# Patient Record
Sex: FEMALE | Race: WHITE | NOT HISPANIC OR LATINO | URBAN - METROPOLITAN AREA
[De-identification: names, ages, dates, MRNs, and addresses within clinical notes are randomized per-mention and may not be internally consistent; named-entity substitution may affect disease eponyms.]

---

## 2024-11-30 ENCOUNTER — OFFICE VISIT (OUTPATIENT)
Dept: URGENT CARE | Age: 59
End: 2024-11-30
Payer: COMMERCIAL

## 2024-11-30 VITALS
DIASTOLIC BLOOD PRESSURE: 96 MMHG | TEMPERATURE: 97.6 F | HEART RATE: 80 BPM | SYSTOLIC BLOOD PRESSURE: 162 MMHG | OXYGEN SATURATION: 97 % | RESPIRATION RATE: 16 BRPM

## 2024-11-30 DIAGNOSIS — M54.50 ACUTE LOW BACK PAIN, UNSPECIFIED BACK PAIN LATERALITY, UNSPECIFIED WHETHER SCIATICA PRESENT: Primary | ICD-10-CM

## 2024-11-30 LAB
POC APPEARANCE, URINE: CLEAR
POC BILIRUBIN, URINE: NEGATIVE
POC BLOOD, URINE: NEGATIVE
POC COLOR, URINE: YELLOW
POC GLUCOSE, URINE: ABNORMAL MG/DL
POC KETONES, URINE: NEGATIVE MG/DL
POC LEUKOCYTES, URINE: NEGATIVE
POC NITRITE,URINE: NEGATIVE
POC PH, URINE: 6 PH
POC PROTEIN, URINE: ABNORMAL MG/DL
POC SPECIFIC GRAVITY, URINE: 1.01
POC UROBILINOGEN, URINE: 0.2 EU/DL

## 2024-11-30 RX ORDER — METHOCARBAMOL 500 MG/1
500 TABLET, FILM COATED ORAL 3 TIMES DAILY
Qty: 20 TABLET | Refills: 0 | Status: SHIPPED | OUTPATIENT
Start: 2024-11-30

## 2024-11-30 RX ORDER — NAPROXEN 375 MG/1
375 TABLET ORAL
Qty: 20 TABLET | Refills: 0 | Status: SHIPPED | OUTPATIENT
Start: 2024-11-30

## 2024-11-30 ASSESSMENT — ENCOUNTER SYMPTOMS
RESPIRATORY NEGATIVE: 1
EYES NEGATIVE: 1
HEMATURIA: 0
CONSTITUTIONAL NEGATIVE: 1
CARDIOVASCULAR NEGATIVE: 1
MYALGIAS: 1
WEAKNESS: 0
HEMATOLOGIC/LYMPHATIC NEGATIVE: 1
NUMBNESS: 0
DYSURIA: 0
ALLERGIC/IMMUNOLOGIC NEGATIVE: 1
PSYCHIATRIC NEGATIVE: 1
ENDOCRINE NEGATIVE: 1
ABDOMINAL PAIN: 0
BACK PAIN: 1

## 2024-11-30 ASSESSMENT — PAIN SCALES - GENERAL: PAINLEVEL_OUTOF10: 10-WORST PAIN EVER

## 2024-11-30 NOTE — PROGRESS NOTES
Subjective   Patient ID: Bindu Klein is a 59 y.o. female. They present today with a chief complaint of Back Pain (2 weeks x lower back pain,nausea, urinary frequency/ decreased urination  ).    History of Present Illness    History provided by:  Patient   used: No    Back Pain  Pertinent negatives include no abdominal pain, dysuria, numbness or weakness.     This is a 59 yr old female here for back pain, Right > left mid to low back pain x several weeks. Denies fall or injury. Denies abdominal pain radiating into the back, dysuria, hematuria, leg or arm pain, parasthesias or weakness in extremities.     Past Medical History  Allergies as of 11/30/2024    (No Known Allergies)       (Not in a hospital admission)       No past medical history on file.    No past surgical history on file.        Review of Systems  Review of Systems   Constitutional: Negative.    HENT: Negative.     Eyes: Negative.    Respiratory: Negative.     Cardiovascular: Negative.    Gastrointestinal:  Negative for abdominal pain.   Endocrine: Negative.    Genitourinary:  Negative for dysuria and hematuria.   Musculoskeletal:  Positive for back pain and myalgias.   Skin: Negative.    Allergic/Immunologic: Negative.    Neurological:  Negative for weakness and numbness.   Hematological: Negative.    Psychiatric/Behavioral: Negative.     All other systems reviewed and are negative.       Objective    Vitals:    11/30/24 1125   BP: (!) 162/96   Pulse: 80   Resp: 16   Temp: 36.4 °C (97.6 °F)   SpO2: 97%     No LMP recorded.    Physical Exam  Vitals and nursing note reviewed.   Constitutional:       Appearance: Normal appearance.   HENT:      Head: Normocephalic and atraumatic.   Cardiovascular:      Rate and Rhythm: Normal rate and regular rhythm.   Pulmonary:      Effort: Pulmonary effort is normal.      Breath sounds: Normal breath sounds.   Abdominal:      Palpations: Abdomen is soft.      Tenderness: There is no abdominal  tenderness.   Musculoskeletal:      Comments: No t or l/s vertebral body tenderness with palpation,paraspinal pain with palpation, limited ROM secondary to pain, distal n-v intact   Skin:     General: Skin is warm and dry.   Neurological:      General: No focal deficit present.      Mental Status: She is alert and oriented to person, place, and time.   Psychiatric:         Mood and Affect: Mood normal.         Behavior: Behavior normal.       Procedures    Point of Care Test & Imaging Results from this visit  Results for orders placed or performed in visit on 11/30/24   POCT UA Automated manually resulted   Result Value Ref Range    POC Color, Urine Yellow Straw, Yellow, Light-Yellow    POC Appearance, Urine Clear Clear    POC Glucose, Urine >=1000 (4+) (A) NEGATIVE mg/dl    POC Bilirubin, Urine NEGATIVE NEGATIVE    POC Ketones, Urine NEGATIVE NEGATIVE mg/dl    POC Specific Gravity, Urine 1.015 1.005 - 1.035    POC Blood, Urine NEGATIVE NEGATIVE    POC PH, Urine 6.0 No Reference Range Established PH    POC Protein, Urine 15 (1+) (A) NEGATIVE, 30 (1+) mg/dl    POC Urobilinogen, Urine 0.2 0.2, 1.0 EU/DL    Poc Nitrite, Urine NEGATIVE NEGATIVE    POC Leukocytes, Urine NEGATIVE NEGATIVE      No results found.    Diagnostic study results (if any) were reviewed by Jacqui Arzola PA-C.    Assessment/Plan   Allergies, medications, history, and pertinent labs/EKGs/Imaging reviewed by Jacqui Arzola PA-C.     Orders and Diagnoses  Diagnoses and all orders for this visit:  Acute low back pain, unspecified back pain laterality, unspecified whether sciatica present  -     POCT UA Automated manually resulted  -     methocarbamol (Robaxin) 500 mg tablet; Take 1 tablet (500 mg) by mouth 3 times a day. May make drowsy  -     naproxen (Naprosyn) 375 mg tablet; Take 1 tablet (375 mg) by mouth 2 times daily (morning and late afternoon). Take with food    Plan:  No blood or leuks in the urine  Meds as above  Ice or heat to  painful areas 2-3 times a day  Pcp follow up this week if not improving or worsening  ER visit anytime 24/7 for acute worsening or changing condition    Patient disposition: Home    Electronically signed by Jacqui Arzola PA-C  1:18 PM

## 2024-11-30 NOTE — PATIENT INSTRUCTIONS
Ice or heat 2-3 times a day   Pcp follow up this week if not improving or worsening  ER visit anytime 24/7 for acute worsening or changing condition

## 2024-12-09 ENCOUNTER — APPOINTMENT (OUTPATIENT)
Dept: RADIOLOGY | Facility: HOSPITAL | Age: 59
End: 2024-12-09
Payer: COMMERCIAL

## 2024-12-09 ENCOUNTER — HOSPITAL ENCOUNTER (EMERGENCY)
Facility: HOSPITAL | Age: 59
Discharge: HOME | End: 2024-12-09
Attending: EMERGENCY MEDICINE
Payer: COMMERCIAL

## 2024-12-09 VITALS
OXYGEN SATURATION: 98 % | HEIGHT: 62 IN | BODY MASS INDEX: 37.61 KG/M2 | WEIGHT: 204.37 LBS | RESPIRATION RATE: 18 BRPM | SYSTOLIC BLOOD PRESSURE: 176 MMHG | DIASTOLIC BLOOD PRESSURE: 86 MMHG | TEMPERATURE: 98.2 F | HEART RATE: 78 BPM

## 2024-12-09 DIAGNOSIS — M54.50 CHRONIC BILATERAL LOW BACK PAIN, UNSPECIFIED WHETHER SCIATICA PRESENT: Primary | ICD-10-CM

## 2024-12-09 DIAGNOSIS — M54.50 ACUTE LOW BACK PAIN, UNSPECIFIED BACK PAIN LATERALITY, UNSPECIFIED WHETHER SCIATICA PRESENT: ICD-10-CM

## 2024-12-09 DIAGNOSIS — J18.9 PNEUMONIA DUE TO INFECTIOUS ORGANISM, UNSPECIFIED LATERALITY, UNSPECIFIED PART OF LUNG: ICD-10-CM

## 2024-12-09 DIAGNOSIS — G89.29 CHRONIC BILATERAL LOW BACK PAIN, UNSPECIFIED WHETHER SCIATICA PRESENT: Primary | ICD-10-CM

## 2024-12-09 LAB
ALBUMIN SERPL BCP-MCNC: 4.3 G/DL (ref 3.4–5)
ALP SERPL-CCNC: 108 U/L (ref 33–110)
ALT SERPL W P-5'-P-CCNC: 32 U/L (ref 7–45)
ANION GAP SERPL CALCULATED.3IONS-SCNC: 11 MMOL/L (ref 10–20)
APPEARANCE UR: CLEAR
AST SERPL W P-5'-P-CCNC: 32 U/L (ref 9–39)
BACTERIA #/AREA URNS AUTO: ABNORMAL /HPF
BASOPHILS # BLD MANUAL: 0 X10*3/UL (ref 0–0.1)
BASOPHILS NFR BLD MANUAL: 0 %
BILIRUB SERPL-MCNC: 0.4 MG/DL (ref 0–1.2)
BILIRUB UR STRIP.AUTO-MCNC: NEGATIVE MG/DL
BUN SERPL-MCNC: 16 MG/DL (ref 6–23)
CALCIUM SERPL-MCNC: 9.4 MG/DL (ref 8.6–10.3)
CHLORIDE SERPL-SCNC: 98 MMOL/L (ref 98–107)
CO2 SERPL-SCNC: 30 MMOL/L (ref 21–32)
COLOR UR: ABNORMAL
CREAT SERPL-MCNC: 1.01 MG/DL (ref 0.5–1.05)
EGFRCR SERPLBLD CKD-EPI 2021: 65 ML/MIN/1.73M*2
EOSINOPHIL # BLD MANUAL: 0 X10*3/UL (ref 0–0.7)
EOSINOPHIL NFR BLD MANUAL: 0 %
ERYTHROCYTE [DISTWIDTH] IN BLOOD BY AUTOMATED COUNT: 13.2 % (ref 11.5–14.5)
GLUCOSE SERPL-MCNC: 261 MG/DL (ref 74–99)
GLUCOSE UR STRIP.AUTO-MCNC: ABNORMAL MG/DL
HCG UR QL IA.RAPID: NEGATIVE
HCT VFR BLD AUTO: 43.9 % (ref 36–46)
HGB BLD-MCNC: 15.1 G/DL (ref 12–16)
HOLD SPECIMEN: NORMAL
IMM GRANULOCYTES # BLD AUTO: 0.07 X10*3/UL (ref 0–0.7)
IMM GRANULOCYTES NFR BLD AUTO: 1.4 % (ref 0–0.9)
KETONES UR STRIP.AUTO-MCNC: ABNORMAL MG/DL
LEUKOCYTE ESTERASE UR QL STRIP.AUTO: ABNORMAL
LIPASE SERPL-CCNC: 12 U/L (ref 9–82)
LYMPHOCYTES # BLD MANUAL: 2.96 X10*3/UL (ref 1.2–4.8)
LYMPHOCYTES NFR BLD MANUAL: 58 %
MCH RBC QN AUTO: 31.5 PG (ref 26–34)
MCHC RBC AUTO-ENTMCNC: 34.4 G/DL (ref 32–36)
MCV RBC AUTO: 92 FL (ref 80–100)
MONOCYTES # BLD MANUAL: 0.31 X10*3/UL (ref 0.1–1)
MONOCYTES NFR BLD MANUAL: 6 %
MUCOUS THREADS #/AREA URNS AUTO: ABNORMAL /LPF
NEUTROPHILS # BLD MANUAL: 1.73 X10*3/UL (ref 1.2–7.7)
NEUTS BAND # BLD MANUAL: 0.05 X10*3/UL (ref 0–0.7)
NEUTS BAND NFR BLD MANUAL: 1 %
NEUTS SEG # BLD MANUAL: 1.68 X10*3/UL (ref 1.2–7)
NEUTS SEG NFR BLD MANUAL: 33 %
NITRITE UR QL STRIP.AUTO: NEGATIVE
NRBC BLD-RTO: 0 /100 WBCS (ref 0–0)
PH UR STRIP.AUTO: 6 [PH]
PLATELET # BLD AUTO: 178 X10*3/UL (ref 150–450)
POTASSIUM SERPL-SCNC: 4.3 MMOL/L (ref 3.5–5.3)
PROT SERPL-MCNC: 7.9 G/DL (ref 6.4–8.2)
PROT UR STRIP.AUTO-MCNC: ABNORMAL MG/DL
RBC # BLD AUTO: 4.79 X10*6/UL (ref 4–5.2)
RBC # UR STRIP.AUTO: ABNORMAL /UL
RBC #/AREA URNS AUTO: ABNORMAL /HPF
RBC MORPH BLD: NORMAL
SODIUM SERPL-SCNC: 135 MMOL/L (ref 136–145)
SP GR UR STRIP.AUTO: 1.04
SQUAMOUS #/AREA URNS AUTO: ABNORMAL /HPF
TOTAL CELLS COUNTED BLD: 100
TRANS CELLS #/AREA UR COMP ASSIST: ABNORMAL /HPF
UROBILINOGEN UR STRIP.AUTO-MCNC: NORMAL MG/DL
VARIANT LYMPHS # BLD MANUAL: 0.1 X10*3/UL (ref 0–0.5)
VARIANT LYMPHS NFR BLD: 2 %
WBC # BLD AUTO: 5.1 X10*3/UL (ref 4.4–11.3)
WBC #/AREA URNS AUTO: ABNORMAL /HPF

## 2024-12-09 PROCEDURE — 74177 CT ABD & PELVIS W/CONTRAST: CPT

## 2024-12-09 PROCEDURE — 2550000001 HC RX 255 CONTRASTS: Performed by: EMERGENCY MEDICINE

## 2024-12-09 PROCEDURE — 96374 THER/PROPH/DIAG INJ IV PUSH: CPT | Performed by: EMERGENCY MEDICINE

## 2024-12-09 PROCEDURE — 81025 URINE PREGNANCY TEST: CPT | Performed by: EMERGENCY MEDICINE

## 2024-12-09 PROCEDURE — 36415 COLL VENOUS BLD VENIPUNCTURE: CPT | Performed by: EMERGENCY MEDICINE

## 2024-12-09 PROCEDURE — 74177 CT ABD & PELVIS W/CONTRAST: CPT | Mod: FOREIGN READ | Performed by: RADIOLOGY

## 2024-12-09 PROCEDURE — 87086 URINE CULTURE/COLONY COUNT: CPT | Mod: WESLAB | Performed by: EMERGENCY MEDICINE

## 2024-12-09 PROCEDURE — 99285 EMERGENCY DEPT VISIT HI MDM: CPT | Mod: 25 | Performed by: EMERGENCY MEDICINE

## 2024-12-09 PROCEDURE — 85027 COMPLETE CBC AUTOMATED: CPT | Performed by: EMERGENCY MEDICINE

## 2024-12-09 PROCEDURE — 2500000001 HC RX 250 WO HCPCS SELF ADMINISTERED DRUGS (ALT 637 FOR MEDICARE OP): Performed by: EMERGENCY MEDICINE

## 2024-12-09 PROCEDURE — 2500000004 HC RX 250 GENERAL PHARMACY W/ HCPCS (ALT 636 FOR OP/ED): Performed by: EMERGENCY MEDICINE

## 2024-12-09 PROCEDURE — 83690 ASSAY OF LIPASE: CPT | Performed by: EMERGENCY MEDICINE

## 2024-12-09 PROCEDURE — 85007 BL SMEAR W/DIFF WBC COUNT: CPT | Performed by: EMERGENCY MEDICINE

## 2024-12-09 PROCEDURE — 81001 URINALYSIS AUTO W/SCOPE: CPT | Performed by: EMERGENCY MEDICINE

## 2024-12-09 PROCEDURE — 84075 ASSAY ALKALINE PHOSPHATASE: CPT | Performed by: EMERGENCY MEDICINE

## 2024-12-09 RX ORDER — DOXYCYCLINE 100 MG/1
100 TABLET ORAL 2 TIMES DAILY
Qty: 14 TABLET | Refills: 0 | Status: SHIPPED | OUTPATIENT
Start: 2024-12-09 | End: 2024-12-16

## 2024-12-09 RX ORDER — FAMOTIDINE 20 MG/1
20 TABLET, FILM COATED ORAL DAILY
Qty: 30 TABLET | Refills: 0 | Status: SHIPPED | OUTPATIENT
Start: 2024-12-09 | End: 2025-01-08

## 2024-12-09 RX ORDER — METHOCARBAMOL 500 MG/1
500 TABLET, FILM COATED ORAL 3 TIMES DAILY
Qty: 20 TABLET | Refills: 0 | Status: SHIPPED | OUTPATIENT
Start: 2024-12-09

## 2024-12-09 RX ORDER — ACETAMINOPHEN 325 MG/1
975 TABLET ORAL ONCE
Status: COMPLETED | OUTPATIENT
Start: 2024-12-09 | End: 2024-12-09

## 2024-12-09 RX ORDER — KETOROLAC TROMETHAMINE 30 MG/ML
15 INJECTION, SOLUTION INTRAMUSCULAR; INTRAVENOUS ONCE
Status: COMPLETED | OUTPATIENT
Start: 2024-12-09 | End: 2024-12-09

## 2024-12-09 ASSESSMENT — COLUMBIA-SUICIDE SEVERITY RATING SCALE - C-SSRS
2. HAVE YOU ACTUALLY HAD ANY THOUGHTS OF KILLING YOURSELF?: NO
6. HAVE YOU EVER DONE ANYTHING, STARTED TO DO ANYTHING, OR PREPARED TO DO ANYTHING TO END YOUR LIFE?: NO
1. IN THE PAST MONTH, HAVE YOU WISHED YOU WERE DEAD OR WISHED YOU COULD GO TO SLEEP AND NOT WAKE UP?: NO

## 2024-12-09 ASSESSMENT — PAIN DESCRIPTION - LOCATION: LOCATION: BACK

## 2024-12-09 ASSESSMENT — LIFESTYLE VARIABLES
TOTAL SCORE: 0
HAVE YOU EVER FELT YOU SHOULD CUT DOWN ON YOUR DRINKING: NO
EVER FELT BAD OR GUILTY ABOUT YOUR DRINKING: NO
EVER HAD A DRINK FIRST THING IN THE MORNING TO STEADY YOUR NERVES TO GET RID OF A HANGOVER: NO
HAVE PEOPLE ANNOYED YOU BY CRITICIZING YOUR DRINKING: NO

## 2024-12-09 ASSESSMENT — PAIN - FUNCTIONAL ASSESSMENT: PAIN_FUNCTIONAL_ASSESSMENT: 0-10

## 2024-12-09 ASSESSMENT — PAIN SCALES - GENERAL: PAINLEVEL_OUTOF10: 10 - WORST POSSIBLE PAIN

## 2024-12-09 ASSESSMENT — PAIN DESCRIPTION - PAIN TYPE: TYPE: ACUTE PAIN

## 2024-12-09 NOTE — ED PROVIDER NOTES
HPI   Chief Complaint   Patient presents with    Back Pain     Lower back pain x1 month- pt denies any injury. Pt denies any urinary symptoms. Pt states she was seen at  on 11/20/24 and was prescribed muscle relaxers and pain medication with no relief. Pt states she now has numbness in fingers and left great toe.        HPI  See my MDM      Patient History   No past medical history on file.  No past surgical history on file.  No family history on file.  Social History     Tobacco Use    Smoking status: Not on file    Smokeless tobacco: Not on file   Substance Use Topics    Alcohol use: Not on file    Drug use: Not on file       Physical Exam   ED Triage Vitals [12/09/24 1039]   Temperature Heart Rate Respirations BP   36.8 °C (98.2 °F) 76 18 (!) 186/109      Pulse Ox Temp src Heart Rate Source Patient Position   98 % -- -- Sitting      BP Location FiO2 (%)     Left arm --       Physical Exam  CONSTITUTIONAL: Vital signs reviewed as charted, well-developed and in no distress  Eyes: Extraocular muscles are intact. Pupils equal round and reactive to light. Conjunctiva are pink.    ENT: Mucous membranes are moist. Tongue in the midline. Pharynx was without erythema or exudates, uvula midline  LUNGS: Breath sounds equal and clear to auscultation. Good air exchange, no wheezes rales or retractions, pulse oximetry is charted.  HEART: Regular rate and rhythm without murmur thrill or rub, strong tones, auscultation is normal.  ABDOMEN: Soft and nontender without guarding rebound rigidity or mass. Bowel sounds are present and normal in all quadrants. There is no palpable masses or aneurysms identified. No hepatosplenomegaly, normal abdominal exam.  Neuro: The patient is awake, alert and oriented ×3. Moving all 4 extremities and answering questions appropriately.   MUSCULOSKELETAL: The calves are nontender to palpation. Full gross active range of motion.   PSYCH: Awake alert oriented, normal mood and affect.  Skin:  Dry,  normal color, warm to the touch, no rash present.        ED Course & MDM   Diagnoses as of 12/09/24 1453   Chronic bilateral low back pain, unspecified whether sciatica present   Pneumonia due to infectious organism, unspecified laterality, unspecified part of lung                 No data recorded     [place] Coma Scale Score: 15 (12/09/24 1134 : Mis Matthews LPN)                           Medical Decision Making  History obtained from: patient    Vital signs, nursing notes, current medications, past medical history, Surgical history, allergies, social history, family History were reviewed.         HPI:  Patient 59-year-old female presenting emergency room today for evaluation of low back pain and lower abdominal pain.  States it has been ongoing for 1 to 2 months.  No exhibiting exacerbating factors.  Was prescribed muscle relaxers the past without much relief.  Denies any pain numbness  weakness in her arms or legs.  She denies fever chills night sweats.  Denies bowel or bladder dysfunction.  Denies history of IV drug abuse.  She is nontoxic and well-appearing      10 point ROS was reviewed and negative except Noted above in HPI.  DDX: as listed above          MDM Summary/considerations:  Labs Reviewed   CBC WITH AUTO DIFFERENTIAL - Abnormal       Result Value    WBC 5.1      nRBC 0.0      RBC 4.79      Hemoglobin 15.1      Hematocrit 43.9      MCV 92      MCH 31.5      MCHC 34.4      RDW 13.2      Platelets 178      Immature Granulocytes %, Automated 1.4 (*)     Immature Granulocytes Absolute, Automated 0.07      Narrative:     The previously reported component Neutrophils % is no longer being reported.  The previously reported component Lymphocytes % is no longer being reported.  The previously reported component Monocytes % is no longer being reported.  The previously   reported component Eosinophils % is no longer being reported.  The previously reported component Basophils % is no longer being  reported.  The previously reported component Absolute Neutrophils is no longer being reported.  The previously reported   component Absolute Lymphocytes is no longer being reported.  The previously reported component Absolute Monocytes is no longer being reported.  The previously reported component Absolute Eosinophils is no longer being reported.  The previously reported   component Absolute Basophils is no longer being reported.   COMPREHENSIVE METABOLIC PANEL - Abnormal    Glucose 261 (*)     Sodium 135 (*)     Potassium 4.3      Chloride 98      Bicarbonate 30      Anion Gap 11      Urea Nitrogen 16      Creatinine 1.01      eGFR 65      Calcium 9.4      Albumin 4.3      Alkaline Phosphatase 108      Total Protein 7.9      AST 32      Bilirubin, Total 0.4      ALT 32     URINALYSIS WITH REFLEX CULTURE AND MICROSCOPIC - Abnormal    Color, Urine Light-Yellow      Appearance, Urine Clear      Specific Gravity, Urine 1.039 (*)     pH, Urine 6.0      Protein, Urine 100 (2+) (*)     Glucose, Urine OVER (4+) (*)     Blood, Urine 0.03 (TRACE) (*)     Ketones, Urine 10 (1+) (*)     Bilirubin, Urine NEGATIVE      Urobilinogen, Urine Normal      Nitrite, Urine NEGATIVE      Leukocyte Esterase, Urine 250 Thelma/µL (*)     Narrative:     OVER is reported when the result is greater than the clinically reportable range.   MICROSCOPIC ONLY, URINE - Abnormal    WBC, Urine 21-50 (*)     RBC, Urine 11-20 (*)     Squamous Epithelial Cells, Urine 1-9 (SPARSE)      Transitional Epithelial Cells, Urine 1-2 (FEW)      Bacteria, Urine 1+ (*)     Mucus, Urine FEW     HCG, URINE, QUALITATIVE - Normal    HCG, Urine NEGATIVE     LIPASE - Normal    Lipase 12      Narrative:     Venipuncture immediately after or during the administration of Metamizole may lead to falsely low results. Testing should be performed immediately prior to Metamizole dosing.   URINE CULTURE   URINALYSIS WITH REFLEX CULTURE AND MICROSCOPIC    Narrative:     The following  orders were created for panel order Urinalysis with Reflex Culture and Microscopic.  Procedure                               Abnormality         Status                     ---------                               -----------         ------                     Urinalysis with Reflex C...[[id number]]  Abnormal            Final result               Extra Urine Gray Tube[[id number]]                            In process                   Please view results for these tests on the individual orders.   EXTRA URINE GRAY TUBE   MANUAL DIFFERENTIAL    Neutrophils %, Manual 33.0      Bands %, Manual 1.0      Lymphocytes %, Manual 58.0      Monocytes %, Manual 6.0      Eosinophils %, Manual 0.0      Basophils %, Manual 0.0      Atypical Lymphocytes %, Manual 2.0      Seg Neutrophils Absolute, Manual 1.68      Bands Absolute, Manual 0.05      Lymphocytes Absolute, Manual 2.96      Monocytes Absolute, Manual 0.31      Eosinophils Absolute, Manual 0.00      Basophils Absolute, Manual 0.00      Atypical Lymphs Absolute, Manual 0.10      Total Cells Counted 100      Neutrophils Absolute, Manual 1.73      RBC Morphology No significant RBC morphology present       CT abdomen pelvis w IV contrast   Final Result   1. Question mild gastroenteritis.   2. Patchy consolidation in the right lower lobe and minimally in the   right middle lobe. Correlate clinically for pneumonia.   3. Trace right pleural effusion.   4. Moderate coronary artery calcifications.   5. Mild hepatic steatosis.   Signed by Kevin Sanchez MD        Medications   acetaminophen (Tylenol) tablet 975 mg (975 mg oral Given 12/9/24 1132)   ketorolac (Toradol) injection 15 mg (15 mg intravenous Given 12/9/24 1141)   iohexol (OMNIPaque) 350 mg iodine/mL solution 75 mL (75 mL intravenous Given 12/9/24 1247)     Discharge Medication List as of 12/9/2024  2:34 PM        START taking these medications    Details   doxycycline (Adoxa) 100 mg tablet Take 1 tablet (100 mg) by mouth 2  times a day for 7 days. Take with a full glass of water and do not lie down for at least 30 minutes after, Starting Mon 12/9/2024, Until Mon 12/16/2024, Normal      famotidine (Pepcid) 20 mg tablet Take 1 tablet (20 mg) by mouth once daily., Starting Mon 12/9/2024, Until Wed 1/8/2025, Normal           I estimate there is a low risk for abdominal aortic aneurysm, cauda equina syndrome, epidural mass lesion, spinal stenosis, herniated disc causing severe stenosis, thus I considered the discharge disposition reasonable. I have discussed the diagnosis and risks with the patient and we agreed with discharging home to follow up with her primary care doctor. I also discussed returning to the emergency department immediately if new or worsening symptoms occur. I have discussed the symptoms which are most concerning such as saddle anesthesia, urinary or bowel incontinence or retention, and changing or worsening pain that would necessitate immediate return.      Patient's workup is grossly unremarkable.  CT scan does question some gastroenteritis.  States she did have some nausea and dry heaving last week.  She does have patchy consolidation the right lower lobe has been having a cough and general upper respiratory symptoms we will treat with antibiotics.    All of the patient's questions were answered to the best of my ability.  Patient states understanding that they have been screened for an emergency today and we have not found any etiology of symptoms that requires emergent treatment or admission to the hospital at this point. They understand that they have not had definitive care day and require follow-up for treatment of their condition. They also state understanding that they may have an emergent condition that may potentially have not of detected at this visit and they must return to the emergency department if they develop any worsening of symptoms or new complaints.      I saw this patient in conjunction with   Ninoska, please see her supervision note.          Critical Care: Not warranted at this time        This chart was completed using voice recognition transcription software. Please excuse any errors of transcription including grammatical, punctuation, syntax and spelling errors.  Please contact me with any questions regarding this chart.    Procedure  Procedures     Boone Morales, COURTNEY-CARLOTTA  12/09/24 2030

## 2024-12-09 NOTE — PROGRESS NOTES
Attestation/Supervisory note for CARLOTTA Marleneaddieamrik      The patient is a 59-year-old female presenting to the emergency department for evaluation of lower back pain and lower abdominal pain.  She states that this is a chronic issue.  She states that she has had pain for more than 1 to 2 months.  She states that the pain is a dull aching pain.  No better or worse.  No radiation.  She denies any headache or visual changes.  No recent injury or trauma.  No focal weakness.  She denies any chest pain or shortness of breath.  No abdominal pain.  No nausea or vomiting.  No diarrhea or constipation.  No urinary complaints.  She states that sometimes she gets some tingling in her fingers and toes.  She states that she went to urgent care about her symptoms and they put her on a muscle relaxer and pain medication but she still had symptoms so she came to the emergency room.  She admits to noncompliance with follow-up with her primary care physician for her symptoms.  She denies any bowel or bladder incontinence.  No urinary retention.  No history of IV drug abuse.  No history of malignancy.  No fever or chills.  All pertinent positives and negatives are recorded above.  All other systems reviewed and otherwise negative.  Vital signs with hypertension but otherwise within normal limits.  Physical exam with a well-nourished well-developed female in no acute distress.  HEENT exam within normal limits.  She has no evidence of airway compromise or respiratory distress.  Abdominal exam is benign.  She does not have any gross motor, neurologic or vascular deficits on exam.  Pulses are equal bilaterally.  NIH stroke scale score of 0.  She has no focal midline neck or back pain with palpation.  No step-offs.  Strength is 5/5 in all 4 strives.  Sensation is intact.  Reflexes are intact.  She is able to walk and stand without assistance.      Oral acetaminophen and IV Toradol ordered.      Diagnostic labs with of a urinary tract infection and  mild hyperglycemia but otherwise unremarkable.      CT abdomen pelvis w IV contrast   Final Result   1. Question mild gastroenteritis.   2. Patchy consolidation in the right lower lobe and minimally in the   right middle lobe. Correlate clinically for pneumonia.   3. Trace right pleural effusion.   4. Moderate coronary artery calcifications.   5. Mild hepatic steatosis.   Signed by Kevin Sanchez MD           The patient does not have any evidence of hemodynamic instability.  She does not have any evidence of airway compromise or respiratory distress.  She has benign abdominal exam.  She is well-perfused on exam and has no evidence of sepsis.  The diagnostic labs do show evidence of urinary tract infection and mild hyperglycemia but otherwise are unremarkable.  CT abdomen pelvis shows evidence of possible gastroenteritis.  There is a patchy consolidation of the right lower lobe and minimal in the right middle lobe.  She does not have any clinical symptoms of pneumonia.  There is also a trace right pleural effusion.  She has mild hepatic steatosis but no evidence of acute process within the abdomen and pelvis.  No evidence of cholecystitis, pancreatitis, bowel obstruction, diverticulitis and/or appendicitis.        The patient was released in good condition with a prescription for doxycycline, Pepcid and Robaxin.  She was instructed to follow-up with her primary care physician within 1 to 2 days for further management of her current symptoms, repeat check of her blood pressure, and review of the urine culture results.  She will return to the emergency department sooner with worsening of symptoms or onset of new symptoms.      Impression/diagnosis:  1.  Low back pain, chronic  2.  Diffuse lower abdominal pain, chronic  3.  Hypertension, unspecified  4.  Acute Lower urinary tract infection  5.  Atypical pneumonia on chest x-ray      I personally saw the patient and made/approve the management plan and take  responsibility for the patient management.      I independently interpreted the following study (S) diagnostic labs      I personally discussed the patient's management with the patient      I reviewed the results of the diagnostic labs and diagnostic imaging.  Formal radiology read was completed by the radiologist.      Glenna Esparza MD

## 2024-12-10 LAB — BACTERIA UR CULT: NORMAL
